# Patient Record
Sex: FEMALE | Race: WHITE | NOT HISPANIC OR LATINO
[De-identification: names, ages, dates, MRNs, and addresses within clinical notes are randomized per-mention and may not be internally consistent; named-entity substitution may affect disease eponyms.]

---

## 2023-03-24 ENCOUNTER — APPOINTMENT (OUTPATIENT)
Dept: ANTEPARTUM | Facility: CLINIC | Age: 33
End: 2023-03-24
Payer: MEDICAID

## 2023-03-24 ENCOUNTER — ASOB RESULT (OUTPATIENT)
Age: 33
End: 2023-03-24

## 2023-03-24 PROCEDURE — 76813 OB US NUCHAL MEAS 1 GEST: CPT

## 2023-03-24 PROCEDURE — 76801 OB US < 14 WKS SINGLE FETUS: CPT

## 2023-05-19 ENCOUNTER — APPOINTMENT (OUTPATIENT)
Dept: ANTEPARTUM | Facility: CLINIC | Age: 33
End: 2023-05-19
Payer: MEDICAID

## 2023-05-19 ENCOUNTER — ASOB RESULT (OUTPATIENT)
Age: 33
End: 2023-05-19

## 2023-05-19 PROCEDURE — 76811 OB US DETAILED SNGL FETUS: CPT

## 2023-07-11 ENCOUNTER — ASOB RESULT (OUTPATIENT)
Age: 33
End: 2023-07-11

## 2023-07-11 ENCOUNTER — APPOINTMENT (OUTPATIENT)
Dept: ANTEPARTUM | Facility: CLINIC | Age: 33
End: 2023-07-11
Payer: MEDICAID

## 2023-07-11 PROCEDURE — 76816 OB US FOLLOW-UP PER FETUS: CPT

## 2023-09-06 ENCOUNTER — APPOINTMENT (OUTPATIENT)
Dept: ANTEPARTUM | Facility: CLINIC | Age: 33
End: 2023-09-06
Payer: MEDICAID

## 2023-09-06 ENCOUNTER — ASOB RESULT (OUTPATIENT)
Age: 33
End: 2023-09-06

## 2023-09-06 PROCEDURE — 76816 OB US FOLLOW-UP PER FETUS: CPT

## 2023-10-01 ENCOUNTER — INPATIENT (INPATIENT)
Facility: HOSPITAL | Age: 33
LOS: 1 days | Discharge: HOME | End: 2023-10-03
Attending: OBSTETRICS & GYNECOLOGY | Admitting: OBSTETRICS & GYNECOLOGY
Payer: MEDICAID

## 2023-10-01 VITALS
HEART RATE: 66 BPM | OXYGEN SATURATION: 99 % | TEMPERATURE: 98 F | SYSTOLIC BLOOD PRESSURE: 139 MMHG | RESPIRATION RATE: 18 BRPM | DIASTOLIC BLOOD PRESSURE: 83 MMHG

## 2023-10-01 DIAGNOSIS — O26.899 OTHER SPECIFIED PREGNANCY RELATED CONDITIONS, UNSPECIFIED TRIMESTER: ICD-10-CM

## 2023-10-01 DIAGNOSIS — Z34.80 ENCOUNTER FOR SUPERVISION OF OTHER NORMAL PREGNANCY, UNSPECIFIED TRIMESTER: ICD-10-CM

## 2023-10-01 LAB
BASOPHILS # BLD AUTO: 0.03 K/UL — SIGNIFICANT CHANGE UP (ref 0–0.2)
BASOPHILS NFR BLD AUTO: 0.2 % — SIGNIFICANT CHANGE UP (ref 0–2)
EOSINOPHIL # BLD AUTO: 0.1 K/UL — SIGNIFICANT CHANGE UP (ref 0–0.5)
EOSINOPHIL NFR BLD AUTO: 0.6 % — SIGNIFICANT CHANGE UP (ref 0–6)
HCT VFR BLD CALC: 33.7 % — LOW (ref 34.5–45)
HGB BLD-MCNC: 11.3 G/DL — LOW (ref 11.5–15.5)
IMM GRANULOCYTES NFR BLD AUTO: 0.9 % — SIGNIFICANT CHANGE UP (ref 0–0.9)
LYMPHOCYTES # BLD AUTO: 1.44 K/UL — SIGNIFICANT CHANGE UP (ref 1–3.3)
LYMPHOCYTES # BLD AUTO: 8.2 % — LOW (ref 13–44)
MCHC RBC-ENTMCNC: 28.8 PG — SIGNIFICANT CHANGE UP (ref 27–34)
MCHC RBC-ENTMCNC: 33.5 GM/DL — SIGNIFICANT CHANGE UP (ref 32–36)
MCV RBC AUTO: 85.8 FL — SIGNIFICANT CHANGE UP (ref 80–100)
MONOCYTES # BLD AUTO: 0.69 K/UL — SIGNIFICANT CHANGE UP (ref 0–0.9)
MONOCYTES NFR BLD AUTO: 3.9 % — SIGNIFICANT CHANGE UP (ref 2–14)
NEUTROPHILS # BLD AUTO: 15.11 K/UL — HIGH (ref 1.8–7.4)
NEUTROPHILS NFR BLD AUTO: 86.2 % — HIGH (ref 43–77)
NRBC # BLD: 0 /100 WBCS — SIGNIFICANT CHANGE UP (ref 0–0)
PLATELET # BLD AUTO: 216 K/UL — SIGNIFICANT CHANGE UP (ref 150–400)
RBC # BLD: 3.93 M/UL — SIGNIFICANT CHANGE UP (ref 3.8–5.2)
RBC # FLD: 13.9 % — SIGNIFICANT CHANGE UP (ref 10.3–14.5)
WBC # BLD: 17.52 K/UL — HIGH (ref 3.8–10.5)
WBC # FLD AUTO: 17.52 K/UL — HIGH (ref 3.8–10.5)

## 2023-10-01 RX ORDER — CHLORHEXIDINE GLUCONATE 213 G/1000ML
1 SOLUTION TOPICAL DAILY
Refills: 0 | Status: DISCONTINUED | OUTPATIENT
Start: 2023-10-01 | End: 2023-10-01

## 2023-10-01 RX ORDER — OXYTOCIN 10 UNIT/ML
333.33 VIAL (ML) INJECTION
Qty: 20 | Refills: 0 | Status: COMPLETED | OUTPATIENT
Start: 2023-10-01 | End: 2023-10-01

## 2023-10-01 RX ORDER — OXYTOCIN 10 UNIT/ML
41.67 VIAL (ML) INJECTION
Qty: 20 | Refills: 0 | Status: DISCONTINUED | OUTPATIENT
Start: 2023-10-01 | End: 2023-10-03

## 2023-10-01 RX ORDER — TETANUS TOXOID, REDUCED DIPHTHERIA TOXOID AND ACELLULAR PERTUSSIS VACCINE, ADSORBED 5; 2.5; 8; 8; 2.5 [IU]/.5ML; [IU]/.5ML; UG/.5ML; UG/.5ML; UG/.5ML
0.5 SUSPENSION INTRAMUSCULAR ONCE
Refills: 0 | Status: DISCONTINUED | OUTPATIENT
Start: 2023-10-01 | End: 2023-10-03

## 2023-10-01 RX ORDER — LANOLIN
1 OINTMENT (GRAM) TOPICAL EVERY 6 HOURS
Refills: 0 | Status: DISCONTINUED | OUTPATIENT
Start: 2023-10-01 | End: 2023-10-03

## 2023-10-01 RX ORDER — SODIUM CHLORIDE 9 MG/ML
3 INJECTION INTRAMUSCULAR; INTRAVENOUS; SUBCUTANEOUS EVERY 8 HOURS
Refills: 0 | Status: DISCONTINUED | OUTPATIENT
Start: 2023-10-01 | End: 2023-10-03

## 2023-10-01 RX ORDER — SIMETHICONE 80 MG/1
80 TABLET, CHEWABLE ORAL EVERY 4 HOURS
Refills: 0 | Status: DISCONTINUED | OUTPATIENT
Start: 2023-10-01 | End: 2023-10-03

## 2023-10-01 RX ORDER — ACETAMINOPHEN 500 MG
975 TABLET ORAL
Refills: 0 | Status: DISCONTINUED | OUTPATIENT
Start: 2023-10-01 | End: 2023-10-03

## 2023-10-01 RX ORDER — KETOROLAC TROMETHAMINE 30 MG/ML
30 SYRINGE (ML) INJECTION ONCE
Refills: 0 | Status: DISCONTINUED | OUTPATIENT
Start: 2023-10-01 | End: 2023-10-01

## 2023-10-01 RX ORDER — SODIUM CHLORIDE 9 MG/ML
1000 INJECTION, SOLUTION INTRAVENOUS
Refills: 0 | Status: DISCONTINUED | OUTPATIENT
Start: 2023-10-01 | End: 2023-10-01

## 2023-10-01 RX ORDER — MAGNESIUM HYDROXIDE 400 MG/1
30 TABLET, CHEWABLE ORAL
Refills: 0 | Status: DISCONTINUED | OUTPATIENT
Start: 2023-10-01 | End: 2023-10-03

## 2023-10-01 RX ORDER — CITRIC ACID/SODIUM CITRATE 300-500 MG
15 SOLUTION, ORAL ORAL EVERY 6 HOURS
Refills: 0 | Status: DISCONTINUED | OUTPATIENT
Start: 2023-10-01 | End: 2023-10-01

## 2023-10-01 RX ORDER — DIPHENHYDRAMINE HCL 50 MG
25 CAPSULE ORAL EVERY 6 HOURS
Refills: 0 | Status: DISCONTINUED | OUTPATIENT
Start: 2023-10-01 | End: 2023-10-03

## 2023-10-01 RX ORDER — OXYCODONE HYDROCHLORIDE 5 MG/1
5 TABLET ORAL ONCE
Refills: 0 | Status: DISCONTINUED | OUTPATIENT
Start: 2023-10-01 | End: 2023-10-03

## 2023-10-01 RX ORDER — OXYCODONE HYDROCHLORIDE 5 MG/1
5 TABLET ORAL
Refills: 0 | Status: DISCONTINUED | OUTPATIENT
Start: 2023-10-01 | End: 2023-10-03

## 2023-10-01 RX ORDER — BENZOCAINE 10 %
1 GEL (GRAM) MUCOUS MEMBRANE EVERY 6 HOURS
Refills: 0 | Status: DISCONTINUED | OUTPATIENT
Start: 2023-10-01 | End: 2023-10-03

## 2023-10-01 RX ORDER — AER TRAVELER 0.5 G/1
1 SOLUTION RECTAL; TOPICAL EVERY 4 HOURS
Refills: 0 | Status: DISCONTINUED | OUTPATIENT
Start: 2023-10-01 | End: 2023-10-03

## 2023-10-01 RX ORDER — OXYTOCIN 10 UNIT/ML
4 VIAL (ML) INJECTION
Qty: 30 | Refills: 0 | Status: DISCONTINUED | OUTPATIENT
Start: 2023-10-01 | End: 2023-10-01

## 2023-10-01 RX ORDER — DIBUCAINE 1 %
1 OINTMENT (GRAM) RECTAL EVERY 6 HOURS
Refills: 0 | Status: DISCONTINUED | OUTPATIENT
Start: 2023-10-01 | End: 2023-10-03

## 2023-10-01 RX ORDER — PRAMOXINE HYDROCHLORIDE 150 MG/15G
1 AEROSOL, FOAM RECTAL EVERY 4 HOURS
Refills: 0 | Status: DISCONTINUED | OUTPATIENT
Start: 2023-10-01 | End: 2023-10-03

## 2023-10-01 RX ORDER — HYDROCORTISONE 1 %
1 OINTMENT (GRAM) TOPICAL EVERY 6 HOURS
Refills: 0 | Status: DISCONTINUED | OUTPATIENT
Start: 2023-10-01 | End: 2023-10-03

## 2023-10-01 RX ORDER — IBUPROFEN 200 MG
600 TABLET ORAL EVERY 6 HOURS
Refills: 0 | Status: DISCONTINUED | OUTPATIENT
Start: 2023-10-01 | End: 2023-10-03

## 2023-10-01 RX ORDER — IBUPROFEN 200 MG
600 TABLET ORAL EVERY 6 HOURS
Refills: 0 | Status: COMPLETED | OUTPATIENT
Start: 2023-10-01 | End: 2024-08-29

## 2023-10-01 RX ADMIN — Medication 1000 MILLIUNIT(S)/MIN: at 17:05

## 2023-10-01 RX ADMIN — SODIUM CHLORIDE 125 MILLILITER(S): 9 INJECTION, SOLUTION INTRAVENOUS at 09:50

## 2023-10-01 RX ADMIN — SODIUM CHLORIDE 3 MILLILITER(S): 9 INJECTION INTRAMUSCULAR; INTRAVENOUS; SUBCUTANEOUS at 22:40

## 2023-10-01 RX ADMIN — CHLORHEXIDINE GLUCONATE 1 APPLICATION(S): 213 SOLUTION TOPICAL at 08:30

## 2023-10-01 RX ADMIN — Medication 975 MILLIGRAM(S): at 21:09

## 2023-10-01 RX ADMIN — Medication 30 MILLIGRAM(S): at 18:07

## 2023-10-01 RX ADMIN — Medication 4 MILLIUNIT(S)/MIN: at 12:00

## 2023-10-01 RX ADMIN — Medication 600 MILLIGRAM(S): at 23:48

## 2023-10-01 RX ADMIN — Medication 1 TABLET(S): at 21:09

## 2023-10-01 RX ADMIN — SODIUM CHLORIDE 125 MILLILITER(S): 9 INJECTION, SOLUTION INTRAVENOUS at 08:15

## 2023-10-01 RX ADMIN — Medication 975 MILLIGRAM(S): at 21:40

## 2023-10-01 NOTE — OB RN TRIAGE NOTE - MENTAL HEALTH CONDITIONS/SYMPTOMS, PROFILE
Patient has been reminded twice (10/2015 and 4/2018) re: need for f/u colonoscopy.    2 page updated and chart routed to pre-admit to contact patient to schedule.    Screening colon and family h/o colon cancer (Father)   none

## 2023-10-01 NOTE — OB PROVIDER H&P - NSLOWPPHRISK_OBGYN_A_OB
No previous uterine incision/Mora Pregnancy/Less than or equal to 4 previous vaginal births/No known bleeding disorder/No history of postpartum hemorrhage/No other PPH risks indicated

## 2023-10-01 NOTE — OB PROVIDER DELIVERY SUMMARY - NSDELIVERYTYPEA_OBGYN_ALL_OB
Alyssazulema Owen Patient Age: 39 year old  MESSAGE:   Patient is calling and requesting to place an order for contacts. Patient was given some trial lenses and she would like to go ahead and order the years supply. Please advise.     WEIGHT AND HEIGHT:   Wt Readings from Last 1 Encounters:   No data found for Wt     Ht Readings from Last 1 Encounters:   No data found for Ht     BMI Readings from Last 1 Encounters:   No data found for BMI       ALLERGIES:  Patient has no known allergies.  No current outpatient medications on file.     No current facility-administered medications for this visit.     PHARMACY to use: N/A          Pharmacy preference(s) on file: No Pharmacies Listed    CALL BACK INFO: Ok to leave response (including medical information) on answering machine  ROUTING: Patient's physician/staff        PCP: Verify Pcp         INS: Payor: [facility] RESOURCES / Plan: [facility] CHOICE [id number] / Product Type: PPO MISC   PATIENT ADDRESS:  [address] [id number]   Vaginal Delivery

## 2023-10-01 NOTE — OB PROVIDER H&P - ASSESSMENT
Assessment  32yoF  at 39w2d presents in labor. SROM at 4am 10/1    Plan  1. Admit to LND. Routine Labs. IVF.  2. Expectant management  3. Fetus: cat 1 tracing. VTX. EFW 3265g by sono. Continuous EFM. Sono. No concerns.  4. Prenatal issues: none  5. GBS negative  6. Pain: epidural PRN      Plan per attending physician, Dr. Lucian Eduardo, PGY1

## 2023-10-01 NOTE — OB PROVIDER H&P - ATTENDING COMMENTS
ob attg note  I agree w/ ob resident's note  pt is nullipara w/ IUP at term, presents in spont active labor.  admit for labor mgmt.

## 2023-10-01 NOTE — OB PROVIDER LABOR PROGRESS NOTE - NS_OBIHIFHRDETAILS_OBGYN_ALL_OB_FT
normal, category 2 FHR tracing
125/mod/prolonged 5m decel recovered to minimal variability. Scalp stim with +acel, then moderate variability.

## 2023-10-01 NOTE — OB RN DELIVERY SUMMARY - NS_SEPSISRSKCALC_OBGYN_ALL_OB_FT
No temperature has been documented for this patient in CPN or on the OB Flowsheet. Ensure the highest temperature during labor was documented on the OB Flowsheet.  No gestational age at birth has been documented. Ensure delivery date/time has been entered above.  Rupture of membranes must be entered above.   EOS calculated successfully. EOS Risk Factor: 0.5/1000 live births ([facility] national incidence); GA=39w2d; Temp=99.32; ROM=13.45; GBS='Negative'; Antibiotics='No antibiotics or any antibiotics < 2 hrs prior to birth'

## 2023-10-01 NOTE — OB PROVIDER LABOR PROGRESS NOTE - NS_SUBJECTIVE/OBJECTIVE_OBGYN_ALL_OB_FT
R3 OB Labor Note    S: Patient seen and examined at bedside.     T(C): 36.8 (10-01-23 @ 09:02), Max: 36.8 (10-01-23 @ 08:25)  HR: 83 (10-01-23 @ 14:14) (61 - 127)  BP: 109/66 (10-01-23 @ 14:08) (109/66 - 149/108)  BP(mean): --  ABP: --  ABP(mean): --  RR: 20 (10-01-23 @ 09:02) (18 - 20)  SpO2: 100% (10-01-23 @ 14:14) (81% - 100%)  Wt(kg): --  CVP(mm Hg): --  CI: --  CAPILLARY BLOOD GLUCOSE       N/A

## 2023-10-01 NOTE — OB RN PATIENT PROFILE - FALL HARM RISK - UNIVERSAL INTERVENTIONS
Bed in lowest position, wheels locked, appropriate side rails in place/Call bell, personal items and telephone in reach/Instruct patient to call for assistance before getting out of bed or chair/Non-slip footwear when patient is out of bed/[place] to call system/Physically safe environment - no spills, clutter or unnecessary equipment/Purposeful Proactive Rounding/Room/bathroom lighting operational, light cord in reach

## 2023-10-01 NOTE — OB RN DELIVERY SUMMARY - NSSELHIDDEN_OBGYN_ALL_OB_FT
[NS_DeliveryAttending1_OBGYN_ALL_OB_FT:[id number]==],[NS_DeliveryRN_OBGYN_ALL_OB_FT:[id number]=]

## 2023-10-01 NOTE — OB PROVIDER H&P - HISTORY OF PRESENT ILLNESS
R1 Admission H&P    Subjective  HPI: 32yoF  at 39w2d gestational age presents for contractions since 3am and gush of fluid at 4am. +FM. -VB. Pt denies any other concerns.    – PNC: Denies prenatal issues. GBS neg.  EFW 3265g extrapolated from 36w sono.  – OBHx:  mTOP,  SAB  – GynHx: denies cysts, fibroids, abnormal pap smears, Hx of STIs  – PMH: denies  – PSH: denies  – Psych: denies   – Social: denies   – Meds: PNV   – Allergies: NKDA  – Will accept blood transfusions? Yes    Objective  – VS  T(C): 36.5 (10-01-23 @ 07:38)  HR: 90 (10-01-23 @ 07:59)  BP: 131/83 (10-01-23 @ 07:56)  RR: 18 (10-01-23 @ 07:36)  SpO2: 99% (10-01-23 @ 07:59)  – PE:   CV: RRR  Pulm: breathing comfortably on RA  Abd: gravid, nontender  Extr: moving all extremities with ease  – Spec: +pooling, +nitrazine  – VE: 4/80/-3  – FHT: baseline 120, mod variability, +accels, -decels  – [place]: q1-2min  – EFW 3265g extrapolated from 36w sono  – Sono: vertex

## 2023-10-01 NOTE — OB PROVIDER DELIVERY SUMMARY - NSSELHIDDEN_OBGYN_ALL_OB_FT
[NS_DeliveryAttending1_OBGYN_ALL_OB_FT:[id number]==],[NS_DeliveryRN_OBGYN_ALL_OB_FT:[id number]=],[NS_DeliveryAssist1_OBGYN_ALL_OB_FT:[id number]=]

## 2023-10-01 NOTE — OB PROVIDER DELIVERY SUMMARY - NSPROVIDERDELIVERYNOTE_OBGYN_ALL_OB_FT
Spontaneous vaginal delivery of liveborn F infant from OA position. Head delivered easily; nuchal cord x2 noted. Reduced after delivery of the shoulders and body. Infant was suctioned. Cord was clamped and cut after 60 seconds and infant was passed to mother. Apgars 9/9. Placenta delivered intact. Fundal massage was given and uterine fundus was found to be firm. Vaginal exam revealed an intact cervix, vaginal walls and sulci. No perineal lacerations. Excellent hemostasis was noted. Patient was stable. Count was correct x 2. . Spontaneous vaginal delivery of liveborn F infant from OA position. Head delivered easily; nuchal cord x2 noted. Reduced after delivery of the shoulders and body. Infant was suctioned. Cord was clamped and cut after 60 seconds and infant was passed to mother. Apgars 9/9. Placenta delivered intact. Fundal massage was given and uterine fundus was found to be firm. Vaginal exam revealed an intact cervix, vaginal walls and sulci. No perineal lacerations. Excellent hemostasis was noted. Patient was stable. Count was correct x 2. .    ob attg  uncomplicated .  nuchal cord x 1 loop.  pvt cord blood/tissue collection done

## 2023-10-01 NOTE — OB PROVIDER LABOR PROGRESS NOTE - ASSESSMENT
a/p:   healthy nullipara, IUP at term.  active spont labor and ROM.  GBS neg.  EFW 7.5 lb by my clinical estimate  contractions may not be adequate at this time.  will start Pitocin augmentation/  pt and her partner were counseled and all q's answered.
A/P:   - Labor: Pt in labor at term and making change. Anticipate . Not feeling urge to push, will labor down.   - Fetus: cat 2 overall reassuring given response to scalp stim   - GBS: neg   - Pain: epi in situ    Sydni Wilson, PGY-4  d/w Dr Epstein

## 2023-10-02 ENCOUNTER — TRANSCRIPTION ENCOUNTER (OUTPATIENT)
Age: 33
End: 2023-10-02

## 2023-10-02 LAB
KLEIHAUER-BETKE CALCULATION: 0 % — SIGNIFICANT CHANGE UP (ref 0–0.3)
T PALLIDUM AB TITR SER: NEGATIVE — SIGNIFICANT CHANGE UP

## 2023-10-02 PROCEDURE — 86077 PHYS BLOOD BANK SERV XMATCH: CPT

## 2023-10-02 RX ORDER — LANOLIN ALCOHOL/MO/W.PET/CERES
3 CREAM (GRAM) TOPICAL ONCE
Refills: 0 | Status: COMPLETED | OUTPATIENT
Start: 2023-10-02 | End: 2023-10-02

## 2023-10-02 RX ORDER — IBUPROFEN 200 MG
1 TABLET ORAL
Qty: 0 | Refills: 0 | DISCHARGE
Start: 2023-10-02

## 2023-10-02 RX ORDER — ACETAMINOPHEN 500 MG
3 TABLET ORAL
Qty: 0 | Refills: 0 | DISCHARGE
Start: 2023-10-02

## 2023-10-02 RX ADMIN — Medication 975 MILLIGRAM(S): at 22:52

## 2023-10-02 RX ADMIN — Medication 975 MILLIGRAM(S): at 15:11

## 2023-10-02 RX ADMIN — Medication 600 MILLIGRAM(S): at 17:21

## 2023-10-02 RX ADMIN — Medication 600 MILLIGRAM(S): at 11:55

## 2023-10-02 RX ADMIN — Medication 3 MILLIGRAM(S): at 14:42

## 2023-10-02 RX ADMIN — Medication 600 MILLIGRAM(S): at 00:20

## 2023-10-02 RX ADMIN — Medication 1 TABLET(S): at 11:56

## 2023-10-02 RX ADMIN — Medication 975 MILLIGRAM(S): at 10:01

## 2023-10-02 RX ADMIN — MAGNESIUM HYDROXIDE 30 MILLILITER(S): 400 TABLET, CHEWABLE ORAL at 14:44

## 2023-10-02 RX ADMIN — Medication 975 MILLIGRAM(S): at 14:41

## 2023-10-02 RX ADMIN — Medication 975 MILLIGRAM(S): at 04:14

## 2023-10-02 RX ADMIN — Medication 600 MILLIGRAM(S): at 23:55

## 2023-10-02 RX ADMIN — Medication 600 MILLIGRAM(S): at 06:25

## 2023-10-02 RX ADMIN — Medication 975 MILLIGRAM(S): at 21:52

## 2023-10-02 RX ADMIN — Medication 975 MILLIGRAM(S): at 03:37

## 2023-10-02 RX ADMIN — Medication 975 MILLIGRAM(S): at 09:31

## 2023-10-02 RX ADMIN — Medication 600 MILLIGRAM(S): at 17:45

## 2023-10-02 RX ADMIN — Medication 600 MILLIGRAM(S): at 05:51

## 2023-10-02 RX ADMIN — Medication 600 MILLIGRAM(S): at 12:00

## 2023-10-02 NOTE — DISCHARGE NOTE OB - CARE PROVIDER_API CALL
Jared Epstein  Obstetrics and Gynecology  [address] [facility] [facility], [address]  [place] [address]  Phone: [phone]  Fax: [phone]  Follow Up Time:

## 2023-10-02 NOTE — DISCHARGE NOTE OB - NS MD DC FALL RISK RISK
For information on Fall & Injury Prevention, visit: [email or web address] OR  [email or web address] OR  https://www.cdc.gov/steadi/patient.html

## 2023-10-02 NOTE — PROGRESS NOTE ADULT - SUBJECTIVE AND OBJECTIVE BOX
OB Progress Note:  PPD#1    S: 31yo PPD#1 s/p . Patient feels well. Pain is well controlled. She is tolerating a regular diet and passing flatus. She is voiding spontaneously, and ambulating without difficulty. Endorses light vaginal bleeding, soaking less than 1 pad/hour. Denies CP/SOB. Denies lightheadedness/dizziness. Denies N/V.     O:  Vitals:  Vital Signs Last 24 Hrs  T(C): 36.5 (02 Oct 2023 00:52), Max: 37.4 (01 Oct 2023 16:00)  T(F): 97.7 (02 Oct 2023 00:52), Max: 99.32 (01 Oct 2023 16:00)  HR: 75 (02 Oct 2023 00:52) (61 - 127)  BP: 118/83 (02 Oct 2023 00:52) (106/74 - 155/73)  BP(mean): 100 (01 Oct 2023 20:10) (100 - 100)  RR: 18 (02 Oct 2023 00:52) (16 - 20)  SpO2: 98% (02 Oct 2023 00:52) (81% - 100%)    Parameters below as of 02 Oct 2023 00:52  Patient On (Oxygen Delivery Method): room air        MEDICATIONS  (STANDING):  acetaminophen     Tablet .. 975 milliGRAM(s) Oral <User Schedule>  diphtheria/tetanus/pertussis (acellular) Vaccine (Adacel) 0.5 milliLiter(s) IntraMuscular once  ibuprofen  Tablet. 600 milliGRAM(s) Oral every 6 hours  oxytocin Infusion 41.667 milliUNIT(s)/Min (125 mL/Hr) IV Continuous <Continuous>  prenatal multivitamin 1 Tablet(s) Oral daily  sodium chloride 0.9% lock flush 3 milliLiter(s) IV Push every 8 hours      Labs:  Blood type: AB Negative  Rubella IgG: RPR:                           11.3<L>   17.52<H> >-----------< 216    ( 10-01 @ 08:57 )             33.7<L>                  Physical Exam:  General: NAD  Heart: all extremities well perfused  Lungs: breathing comfortably  Abdomen: soft, non-tender, non-distended, fundus firm  Vaginal: lochia wnl  Extremities: No calf tenderness or erythema

## 2023-10-02 NOTE — PROGRESS NOTE ADULT - ASSESSMENT
31y/o  PPD#1 from  in stable condition. Patient doing well.     -EBL 200mL  -Continue with PO analgesia  -OOB, increase ambulation   -Continue regular diet    -No labs    Jodie Webb,  PGY-1

## 2023-10-02 NOTE — DISCHARGE NOTE OB - MEDICATION SUMMARY - MEDICATIONS TO TAKE
I will START or STAY ON the medications listed below when I get home from the hospital:    ibuprofen 600 mg oral tablet  -- 1 tab(s) by mouth every 6 hours  -- Indication: For for pain and cramping    acetaminophen 325 mg oral tablet  -- 3 tab(s) by mouth every 8 hours  -- Indication: For for pain and fever    Prenatal Multivitamins with Folic Acid 1 mg oral tablet  -- 1 tab(s) by mouth once a day  -- Indication: For for nutrition

## 2023-10-02 NOTE — PROGRESS NOTE ADULT - ATTENDING COMMENTS
Pt seen and examined.  Agree with resident note  Pt without any complaints  VSS  fundus firm  perineum clear  PPD1  Routine postpartum care  PLEE

## 2023-10-02 NOTE — DISCHARGE NOTE OB - PATIENT PORTAL LINK FT
You can access the FollowMyHealth Patient Portal offered by [facility] by registering at the following website: [email or web address]. By joining [employer]’s FollowMyHealth portal, you will also be able to view your health information using other applications (apps) compatible with our system.

## 2023-10-03 VITALS
HEART RATE: 61 BPM | TEMPERATURE: 98 F | OXYGEN SATURATION: 98 % | SYSTOLIC BLOOD PRESSURE: 108 MMHG | DIASTOLIC BLOOD PRESSURE: 59 MMHG | RESPIRATION RATE: 18 BRPM

## 2023-10-03 PROCEDURE — 36415 COLL VENOUS BLD VENIPUNCTURE: CPT

## 2023-10-03 PROCEDURE — 86900 BLOOD TYPING SEROLOGIC ABO: CPT

## 2023-10-03 PROCEDURE — 86780 TREPONEMA PALLIDUM: CPT

## 2023-10-03 PROCEDURE — 86901 BLOOD TYPING SEROLOGIC RH(D): CPT

## 2023-10-03 PROCEDURE — 85460 HEMOGLOBIN FETAL: CPT

## 2023-10-03 PROCEDURE — 86850 RBC ANTIBODY SCREEN: CPT

## 2023-10-03 PROCEDURE — 86870 RBC ANTIBODY IDENTIFICATION: CPT

## 2023-10-03 PROCEDURE — 85025 COMPLETE CBC W/AUTO DIFF WBC: CPT

## 2023-10-03 RX ADMIN — Medication 975 MILLIGRAM(S): at 09:20

## 2023-10-03 RX ADMIN — Medication 600 MILLIGRAM(S): at 06:33

## 2023-10-03 RX ADMIN — Medication 975 MILLIGRAM(S): at 03:44

## 2023-10-03 RX ADMIN — Medication 975 MILLIGRAM(S): at 04:44

## 2023-10-03 RX ADMIN — Medication 975 MILLIGRAM(S): at 08:41

## 2023-10-03 RX ADMIN — Medication 600 MILLIGRAM(S): at 12:40

## 2023-10-03 RX ADMIN — Medication 1 TABLET(S): at 12:09

## 2023-10-03 RX ADMIN — Medication 600 MILLIGRAM(S): at 00:55

## 2023-10-03 RX ADMIN — Medication 600 MILLIGRAM(S): at 12:08

## 2023-10-03 NOTE — PROGRESS NOTE ADULT - SUBJECTIVE AND OBJECTIVE BOX
S: Patient doing well. Minimal lochia. Pain controlled.    O: Vital Signs Last 24 Hrs  T(C): 36.5 (03 Oct 2023 06:03), Max: 36.9 (02 Oct 2023 17:26)  T(F): 97.7 (03 Oct 2023 06:03), Max: 98.4 (02 Oct 2023 17:26)  HR: 61 (03 Oct 2023 06:03) (61 - 62)  BP: 108/59 (03 Oct 2023 06:03) (108/59 - 126/84)  BP(mean): --  RR: 18 (03 Oct 2023 06:03) (18 - 18)  SpO2: 98% (03 Oct 2023 06:03) (98% - 99%)    Parameters below as of 03 Oct 2023 06:03  Patient On (Oxygen Delivery Method): room air        Gen: NAD  Abd: soft, NT, ND, fundus firm below umbilicus  Lochia: moderate  Ext: no tenderness    Labs:      A: 32y PPD#2 s/p  doing well.    Plan: D/C planning.

## 2023-10-05 ENCOUNTER — APPOINTMENT (OUTPATIENT)
Dept: ANTEPARTUM | Facility: CLINIC | Age: 33
End: 2023-10-05

## 2024-07-04 NOTE — OB PROVIDER LABOR PROGRESS NOTE - NS_OBIHICONTRACTIONPATTERNDETAILS_OBGYN_ALL_OB_FT
Prep Survey      Flowsheet Row Responses   [facility] patient discharged from? [facility]   Is LACE score < 7 ? No   Eligibility Readm Mgmt   Discharge diagnosis Chest pain   Does the patient have one of the following disease processes/diagnoses(primary or secondary)? Other   Does the patient have Home health ordered? No   Is there a DME ordered? No   Prep survey completed? Yes            JAYME STANLEY - Registered Nurse          
irregular
q1-2m

## 2025-01-23 ENCOUNTER — APPOINTMENT (OUTPATIENT)
Dept: ANTEPARTUM | Facility: CLINIC | Age: 35
End: 2025-01-23
Payer: COMMERCIAL

## 2025-01-23 ENCOUNTER — ASOB RESULT (OUTPATIENT)
Age: 35
End: 2025-01-23

## 2025-01-23 PROCEDURE — 76813 OB US NUCHAL MEAS 1 GEST: CPT

## 2025-01-23 PROCEDURE — 76801 OB US < 14 WKS SINGLE FETUS: CPT | Mod: 59

## 2025-03-17 ENCOUNTER — ASOB RESULT (OUTPATIENT)
Age: 35
End: 2025-03-17

## 2025-03-17 ENCOUNTER — APPOINTMENT (OUTPATIENT)
Dept: ANTEPARTUM | Facility: CLINIC | Age: 35
End: 2025-03-17
Payer: COMMERCIAL

## 2025-03-17 PROCEDURE — 76805 OB US >/= 14 WKS SNGL FETUS: CPT

## 2025-05-12 ENCOUNTER — ASOB RESULT (OUTPATIENT)
Age: 35
End: 2025-05-12

## 2025-05-12 ENCOUNTER — APPOINTMENT (OUTPATIENT)
Dept: ANTEPARTUM | Facility: CLINIC | Age: 35
End: 2025-05-12
Payer: COMMERCIAL

## 2025-05-12 PROCEDURE — 76816 OB US FOLLOW-UP PER FETUS: CPT
